# Patient Record
Sex: FEMALE | Employment: UNEMPLOYED
[De-identification: names, ages, dates, MRNs, and addresses within clinical notes are randomized per-mention and may not be internally consistent; named-entity substitution may affect disease eponyms.]

---

## 2023-10-24 ENCOUNTER — APPOINTMENT (OUTPATIENT)
Dept: INTERNAL MEDICINE | Facility: OTHER | Age: 53
End: 2023-10-24
Payer: MEDICAID

## 2023-11-06 ENCOUNTER — OFFICE VISIT (OUTPATIENT)
Dept: INTERNAL MEDICINE | Facility: OTHER | Age: 53
End: 2023-11-06
Payer: MEDICAID

## 2023-11-06 VITALS
OXYGEN SATURATION: 97 % | TEMPERATURE: 97.8 F | HEIGHT: 66 IN | WEIGHT: 198.6 LBS | HEART RATE: 85 BPM | SYSTOLIC BLOOD PRESSURE: 124 MMHG | BODY MASS INDEX: 31.92 KG/M2 | DIASTOLIC BLOOD PRESSURE: 62 MMHG

## 2023-11-06 DIAGNOSIS — Z13.228 SCREENING FOR ENDOCRINE, NUTRITIONAL, METABOLIC AND IMMUNITY DISORDER: ICD-10-CM

## 2023-11-06 DIAGNOSIS — Z13.21 SCREENING FOR ENDOCRINE, NUTRITIONAL, METABOLIC AND IMMUNITY DISORDER: ICD-10-CM

## 2023-11-06 DIAGNOSIS — Z11.59 NEED FOR HEPATITIS C SCREENING TEST: ICD-10-CM

## 2023-11-06 DIAGNOSIS — Z13.29 SCREENING FOR ENDOCRINE, NUTRITIONAL, METABOLIC AND IMMUNITY DISORDER: ICD-10-CM

## 2023-11-06 DIAGNOSIS — E66.9 OBESITY (BMI 30-39.9): ICD-10-CM

## 2023-11-06 DIAGNOSIS — Z13.0 SCREENING FOR ENDOCRINE, NUTRITIONAL, METABOLIC AND IMMUNITY DISORDER: ICD-10-CM

## 2023-11-06 DIAGNOSIS — L91.8 CUTANEOUS SKIN TAGS: ICD-10-CM

## 2023-11-06 DIAGNOSIS — L40.9 PSORIASIS: ICD-10-CM

## 2023-11-06 DIAGNOSIS — Z11.4 SCREENING FOR HIV (HUMAN IMMUNODEFICIENCY VIRUS): ICD-10-CM

## 2023-11-06 PROCEDURE — 3078F DIAST BP <80 MM HG: CPT | Performed by: INTERNAL MEDICINE

## 2023-11-06 PROCEDURE — 3074F SYST BP LT 130 MM HG: CPT | Performed by: INTERNAL MEDICINE

## 2023-11-06 PROCEDURE — 11200 RMVL SKIN TAGS UP TO&INC 15: CPT | Performed by: INTERNAL MEDICINE

## 2023-11-06 PROCEDURE — 99203 OFFICE O/P NEW LOW 30 MIN: CPT | Mod: 25 | Performed by: INTERNAL MEDICINE

## 2023-11-06 RX ORDER — CLOBETASOL PROPIONATE 0.5 MG/G
OINTMENT TOPICAL
Qty: 60 G | Refills: 1 | Status: SHIPPED | OUTPATIENT
Start: 2023-11-06

## 2023-11-06 RX ORDER — OMEGA-3 FATTY ACIDS/FISH OIL 300-1000MG
400 CAPSULE ORAL
COMMUNITY

## 2023-11-06 ASSESSMENT — PATIENT HEALTH QUESTIONNAIRE - PHQ9: CLINICAL INTERPRETATION OF PHQ2 SCORE: 0

## 2023-11-06 ASSESSMENT — FIBROSIS 4 INDEX: FIB4 SCORE: 1.05

## 2023-11-06 NOTE — PROGRESS NOTES
CC:  New patient     HISTORY OF THE PRESENT ILLNESS: Patient is a 53 y.o. female who presents to [facility]. Patient lives in [place]. She recently received medical insurance. Patient reports a PMH for psoriasis, diagnosed in her teens. Initally affected her bilateral anterior legs. She was on topical therapy with improve. However, in the last 10 years, she has recurrence of psoriatic plaques on her elbows and ears. She denies any pain, occasional bleeding from scratching. No drainage.    Patient reports a history of psoriasis, was diagnosed in her teens. She was on topical treatment with improvement, previously affecting the anterior legs. She reports psoriasis of the elbow and ears that started in her 40s. She has been using distilled vingear which helps some. Denies any pain, occasional bleeding when she scratches.     She has skin tags around her neck that are bothersome. Denies any pain or bleeding. She would like them removed.     Patient is also concerned about her varicose veins, mostly on her right leg. She reports that her mother and aunt had very bad varicose veins. She denies any pain. Sometimes has pulling sensation, but does not impede walking or her ADLs.    Health Maintenance:     Screening/Preventative Topics:  Cholesterol Screening: overdue  Diet: Well-balanced   Exercise: Walks 3 times weekly. States that she is physical active as she cares for her 86 yo father.  Screen for depression: PHQ-2: 0  Substance Use: Occasional alcohol use, no tobacco and illict drug  Postmenopausal- 2 years. No abnormal vaginal bleeding. Occasional hot flashes and mood irritable.    Cancer screening  Colorectal Cancer Screenin2023  Cervical Cancer Screening: Never, abstinence for 47 years. Recently .   Breast Cancer Screening:       Immunizations:   Declined all vaccinations    Allergies: Patient has no allergy information on record.    Current Outpatient Medications Ordered in Epic   Medication  "Sig Dispense Refill    Ibuprofen 200 MG Cap Take 400 mg by mouth.      clobetasol (TEMOVATE) 0.05 % Ointment Apply to affected area twice daily for 2 weeks. 60 g 1     No current Epic-ordered facility-administered medications on file.       History reviewed. No pertinent past medical history.    History reviewed. No pertinent surgical history.    Social History     Tobacco Use    Smoking status: Never    Smokeless tobacco: Never   Vaping Use    Vaping Use: Never used   Substance Use Topics    Alcohol use: Not Currently    Drug use: Never       Social History     Social History Narrative    Not on file       Family History   Problem Relation Age of Onset    Heart Disease Mother 65    Hypertension Mother     Diabetes Mother     Valvular heart disease Father 85       ROS:   Constitutional: Negative for fever, chills, weight loss and malaise/fatigue.   HENT: Negative for ear pain, nosebleeds, congestion, sore throat and neck pain.    Eyes: Negative for blurred vision.   Respiratory: Negative for cough, sputum production, shortness of breath and wheezing.    Cardiovascular: Negative for chest pain, palpitations, orthopnea and leg swelling.   Gastrointestinal: Negative for heartburn, nausea, vomiting and abdominal pain.   Genitourinary: Negative for dysuria, urgency and frequency.   Musculoskeletal: Negative for myalgias, back pain and joint pain.   Skin: Positive scaly plaques of elbows and ear  Neurological: Negative for dizziness, tingling, tremors, sensory change, focal weakness and headaches.   Endo/Heme/Allergies: Does not bruise/bleed easily.   Psychiatric/Behavioral: Negative for depression, suicidal ideas and memory loss.   All other systems reviewed and are negative except as in HPI.        Exam: /62 (BP Location: Left arm, Patient Position: Sitting, BP Cuff Size: Large adult)   Pulse 85   Temp 36.6 °C (97.8 °F) (Temporal)   Ht 1.676 m (5' 6\")   Wt 90.1 kg (198 lb 9.6 oz)   SpO2 97%  Body mass index " is 32.05 kg/m².    General: Normal appearing. No distress.  HEENT: Normocephalic. Eyes conjunctiva clear lids without ptosis, pupils equal and reactive to light accommodation. Ears are normal contour. Small area of dry skin on the right tragus. Scaly dry skin of the left tragus and external canal. Tympanic membranes are benign.   Neck: Supple. Thyroid is not enlarged.  Pulmonary: Clear to ausculation.  Normal effort. No rales, ronchi, or wheezing.  Cardiovascular: Regular rate and rhythm without murmur. Carotid and radial pulses are intact and equal bilaterally.  Abdomen: Soft, nontender, nondistended. Normal bowel sounds.   Neurologic: Grossly nonfocal  Lymph: No cervical, or supraclavicular lymph nodes are palpable  Skin: Warm and dry.  Dry raised plaque of the right elbow  Musculoskeletal: Normal gait. No extremity cyanosis, clubbing, or edema.  Psych: Normal mood and affect. Alert and oriented x3. Judgment and insight is normal.    Assessment/Plan    1. Psoriasis  Long-standing history.   Trial of clobetasol for elbow and external ear canal treatment.  - clobetasol (TEMOVATE) 0.05 % Ointment; Apply to affected area twice daily for 2 weeks.  Dispense: 60 g; Refill: 1    2. Screening for HIV (human immunodeficiency virus)  - HIV AG/AB COMBO ASSAY SCREENING; Future    3. Need for hepatitis C screening test  - HEP C VIRUS ANTIBODY; Future    4. Screening for endocrine, nutritional, metabolic and immunity disorder  - CBC WITH DIFFERENTIAL; Future  - Comp Metabolic Panel; Future  - TSH WITH REFLEX TO FT4; Future  - Lipid Profile; Future  - HEMOGLOBIN A1C; Future    5. Obesity (BMI 30-39.9)  - Referral to Nutrition Services    6. Cutaneous skin tags  Cryotherapy to 10 lesions.    HCM  Patient to schedule appt for cervical cancer screening  Mammogram completed 9/2023  Colonoscopy completed 6/2023, with repeat in 2030 per patient  Immunization- pt decline immunizations.     Follow-up in 3 months.

## 2023-11-11 ENCOUNTER — HOSPITAL ENCOUNTER (OUTPATIENT)
Dept: LAB | Facility: MEDICAL CENTER | Age: 53
End: 2023-11-11
Attending: INTERNAL MEDICINE
Payer: MEDICAID

## 2023-11-11 DIAGNOSIS — Z11.4 SCREENING FOR HIV (HUMAN IMMUNODEFICIENCY VIRUS): ICD-10-CM

## 2023-11-11 DIAGNOSIS — Z13.29 SCREENING FOR ENDOCRINE, NUTRITIONAL, METABOLIC AND IMMUNITY DISORDER: ICD-10-CM

## 2023-11-11 DIAGNOSIS — Z13.21 SCREENING FOR ENDOCRINE, NUTRITIONAL, METABOLIC AND IMMUNITY DISORDER: ICD-10-CM

## 2023-11-11 DIAGNOSIS — Z13.0 SCREENING FOR ENDOCRINE, NUTRITIONAL, METABOLIC AND IMMUNITY DISORDER: ICD-10-CM

## 2023-11-11 DIAGNOSIS — Z13.228 SCREENING FOR ENDOCRINE, NUTRITIONAL, METABOLIC AND IMMUNITY DISORDER: ICD-10-CM

## 2023-11-11 DIAGNOSIS — Z11.59 NEED FOR HEPATITIS C SCREENING TEST: ICD-10-CM

## 2023-11-11 LAB
ALBUMIN SERPL BCP-MCNC: 4.6 G/DL (ref 3.2–4.9)
ALBUMIN/GLOB SERPL: 1.6 G/DL
ALP SERPL-CCNC: 56 U/L (ref 30–99)
ALT SERPL-CCNC: 20 U/L (ref 2–50)
ANION GAP SERPL CALC-SCNC: 10 MMOL/L (ref 7–16)
AST SERPL-CCNC: 16 U/L (ref 12–45)
BASOPHILS # BLD AUTO: 0.8 % (ref 0–1.8)
BASOPHILS # BLD: 0.04 K/UL (ref 0–0.12)
BILIRUB SERPL-MCNC: 0.4 MG/DL (ref 0.1–1.5)
BUN SERPL-MCNC: 14 MG/DL (ref 8–22)
CALCIUM ALBUM COR SERPL-MCNC: 9.7 MG/DL (ref 8.5–10.5)
CALCIUM SERPL-MCNC: 10.2 MG/DL (ref 8.5–10.5)
CHLORIDE SERPL-SCNC: 101 MMOL/L (ref 96–112)
CHOLEST SERPL-MCNC: 188 MG/DL (ref 100–199)
CO2 SERPL-SCNC: 27 MMOL/L (ref 20–33)
CREAT SERPL-MCNC: 0.89 MG/DL (ref 0.5–1.4)
EOSINOPHIL # BLD AUTO: 0.06 K/UL (ref 0–0.51)
EOSINOPHIL NFR BLD: 1.2 % (ref 0–6.9)
ERYTHROCYTE [DISTWIDTH] IN BLOOD BY AUTOMATED COUNT: 39.8 FL (ref 35.9–50)
EST. AVERAGE GLUCOSE BLD GHB EST-MCNC: 105 MG/DL
FASTING STATUS PATIENT QL REPORTED: NORMAL
GFR SERPLBLD CREATININE-BSD FMLA CKD-EPI: 78 ML/MIN/1.73 M 2
GLOBULIN SER CALC-MCNC: 2.9 G/DL (ref 1.9–3.5)
GLUCOSE SERPL-MCNC: 102 MG/DL (ref 65–99)
HBA1C MFR BLD: 5.3 % (ref 4–5.6)
HCT VFR BLD AUTO: 46.1 % (ref 37–47)
HCV AB SER QL: NORMAL
HDLC SERPL-MCNC: 44 MG/DL
HGB BLD-MCNC: 15.5 G/DL (ref 12–16)
HIV 1+2 AB+HIV1 P24 AG SERPL QL IA: NORMAL
IMM GRANULOCYTES # BLD AUTO: 0.01 K/UL (ref 0–0.11)
IMM GRANULOCYTES NFR BLD AUTO: 0.2 % (ref 0–0.9)
LDLC SERPL CALC-MCNC: 127 MG/DL
LYMPHOCYTES # BLD AUTO: 1.58 K/UL (ref 1–4.8)
LYMPHOCYTES NFR BLD: 31.1 % (ref 22–41)
MCH RBC QN AUTO: 30.3 PG (ref 27–33)
MCHC RBC AUTO-ENTMCNC: 33.6 G/DL (ref 32.2–35.5)
MCV RBC AUTO: 90 FL (ref 81.4–97.8)
MONOCYTES # BLD AUTO: 0.3 K/UL (ref 0–0.85)
MONOCYTES NFR BLD AUTO: 5.9 % (ref 0–13.4)
NEUTROPHILS # BLD AUTO: 3.09 K/UL (ref 1.82–7.42)
NEUTROPHILS NFR BLD: 60.8 % (ref 44–72)
NRBC # BLD AUTO: 0 K/UL
NRBC BLD-RTO: 0 /100 WBC (ref 0–0.2)
PLATELET # BLD AUTO: 195 K/UL (ref 164–446)
PMV BLD AUTO: 11 FL (ref 9–12.9)
POTASSIUM SERPL-SCNC: 4.6 MMOL/L (ref 3.6–5.5)
PROT SERPL-MCNC: 7.5 G/DL (ref 6–8.2)
RBC # BLD AUTO: 5.12 M/UL (ref 4.2–5.4)
SODIUM SERPL-SCNC: 138 MMOL/L (ref 135–145)
TRIGL SERPL-MCNC: 83 MG/DL (ref 0–149)
TSH SERPL DL<=0.005 MIU/L-ACNC: 1.74 UIU/ML (ref 0.38–5.33)
WBC # BLD AUTO: 5.1 K/UL (ref 4.8–10.8)

## 2023-11-11 PROCEDURE — 83036 HEMOGLOBIN GLYCOSYLATED A1C: CPT

## 2023-11-11 PROCEDURE — 87389 HIV-1 AG W/HIV-1&-2 AB AG IA: CPT

## 2023-11-11 PROCEDURE — 36415 COLL VENOUS BLD VENIPUNCTURE: CPT

## 2023-11-11 PROCEDURE — 80061 LIPID PANEL: CPT

## 2023-11-11 PROCEDURE — 84443 ASSAY THYROID STIM HORMONE: CPT

## 2023-11-11 PROCEDURE — 86803 HEPATITIS C AB TEST: CPT

## 2023-11-11 PROCEDURE — 80053 COMPREHEN METABOLIC PANEL: CPT

## 2023-11-11 PROCEDURE — 85025 COMPLETE CBC W/AUTO DIFF WBC: CPT

## 2024-02-12 ENCOUNTER — OFFICE VISIT (OUTPATIENT)
Dept: INTERNAL MEDICINE | Facility: OTHER | Age: 54
End: 2024-02-12
Payer: MEDICAID

## 2024-02-12 VITALS
HEART RATE: 93 BPM | HEIGHT: 66 IN | BODY MASS INDEX: 32.47 KG/M2 | OXYGEN SATURATION: 98 % | WEIGHT: 202 LBS | TEMPERATURE: 97.8 F | DIASTOLIC BLOOD PRESSURE: 81 MMHG | SYSTOLIC BLOOD PRESSURE: 121 MMHG

## 2024-02-12 DIAGNOSIS — Z01.419 ENCOUNTER FOR GYNECOLOGICAL EXAMINATION: ICD-10-CM

## 2024-02-12 DIAGNOSIS — N94.10 DYSPAREUNIA IN FEMALE: ICD-10-CM

## 2024-02-12 DIAGNOSIS — R10.31 RIGHT LOWER QUADRANT PAIN: ICD-10-CM

## 2024-02-12 DIAGNOSIS — Z12.4 SCREENING FOR CERVICAL CANCER: ICD-10-CM

## 2024-02-12 DIAGNOSIS — Z11.51 SCREENING FOR HPV (HUMAN PAPILLOMAVIRUS): ICD-10-CM

## 2024-02-12 DIAGNOSIS — R09.82 PND (POST-NASAL DRIP): ICD-10-CM

## 2024-02-12 PROCEDURE — 99396 PREV VISIT EST AGE 40-64: CPT | Performed by: INTERNAL MEDICINE

## 2024-02-12 PROCEDURE — 3079F DIAST BP 80-89 MM HG: CPT | Performed by: INTERNAL MEDICINE

## 2024-02-12 PROCEDURE — 3074F SYST BP LT 130 MM HG: CPT | Performed by: INTERNAL MEDICINE

## 2024-02-12 RX ORDER — LORATADINE 10 MG/1
10 TABLET ORAL DAILY
Qty: 30 TABLET | Refills: 2 | Status: SHIPPED | OUTPATIENT
Start: 2024-02-12

## 2024-02-12 ASSESSMENT — PATIENT HEALTH QUESTIONNAIRE - PHQ9: CLINICAL INTERPRETATION OF PHQ2 SCORE: 0

## 2024-02-12 ASSESSMENT — FIBROSIS 4 INDEX: FIB4 SCORE: 0.94

## 2024-02-12 ASSESSMENT — PAIN SCALES - GENERAL: PAINLEVEL: 5=MODERATE PAIN

## 2024-02-12 NOTE — PROGRESS NOTES
Subjective:     CC:   Chief Complaint   Patient presents with    Gynecologic Exam       HPI:   Lay Adame is a 53 y.o. female who presents for annual exam.     Patient has GYN provider: No   Last Pap Smear: Never  H/O Abnormal Pap: No  Last Mammogram: 2023  Last Colorectal Cancer Screenin2023  Last Tdap: declined  Received HPV series: No    Menopausal 2 years  She has not utilized hormone replacement therapy.  Reports of dyspareunia, most notable on insertion. She abstain for sexual activity until  at 51 yo. She denies any abnormal vaginal discharge or bleeding.  No significant bloating/fluid retention, pelvic pain. No abnormal vaginal discharge.   No breast tenderness, mass, nipple discharge or changes in size or contour.  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 She only wears pads now.    Exercise: minimal exercise, one hour walking weekly  Diet: Well-balance    Today, she also c/o right lower quadrant pain for the past 5 years, which has slightly worsen. Pain is sharp in nature, without radiation. Most notable with heavy lifting and straining. No history of abdominal surgeries. Denies any fever, nausea, vomiting, diarrhea, melena or bloody stool.      OB History   No obstetric history on file.      She  has no past medical history on file.  She  has no past surgical history on file.    Family History   Problem Relation Age of Onset    Heart Disease Mother 65    Hypertension Mother     Diabetes Mother     Valvular heart disease Father 85     Social History     Tobacco Use    Smoking status: Never    Smokeless tobacco: Never   Vaping Use    Vaping Use: Never used   Substance Use Topics    Alcohol use: Not Currently    Drug use: Never       Patient Active Problem List    Diagnosis Date Noted    Obesity (BMI 30-39.9) 2023     Current Outpatient Medications   Medication Sig Dispense Refill    loratadine (CLARITIN) 10 MG Tab Take 1 Tablet by mouth every day. 30 Tablet  "2    Ibuprofen 200 MG Cap Take 400 mg by mouth.      clobetasol (TEMOVATE) 0.05 % Ointment Apply to affected area twice daily for 2 weeks. 60 g 1     No current facility-administered medications for this visit.         Review of Systems   Constitutional: Negative for fever, chills and malaise/fatigue.   HENT: Negative for congestion.    Eyes: Negative for pain.   Respiratory: Negative for cough and shortness of breath.    Cardiovascular: Negative for chest pain and leg swelling.   Gastrointestinal: Negative for nausea, vomiting, abdominal pain and diarrhea.   Genitourinary: Negative for dysuria and hematuria.   Skin: Negative for rash.   Neurological: Negative for dizziness, focal weakness and headaches.   Endo/Heme/Allergies: Does not bruise/bleed easily.   Psychiatric/Behavioral: Negative for depression.  The patient is not nervous/anxious.      Objective:   /81 (BP Location: Left arm, Patient Position: Sitting, BP Cuff Size: Adult)   Pulse 93   Temp 36.6 °C (97.8 °F) (Temporal)   Ht 1.676 m (5' 6\")   Wt 91.6 kg (202 lb)   SpO2 98%   BMI 32.60 kg/m²     Wt Readings from Last 4 Encounters:   02/12/24 91.6 kg (202 lb)   11/06/23 90.1 kg (198 lb 9.6 oz)     Physical Exam:  Constitutional: Well-developed and well-nourished. Not diaphoretic. No distress.   Skin: Skin is warm and dry. No rash noted.  Head: Atraumatic without lesions.  Eyes: Conjunctivae and extraocular motions are normal. Pupils are equal, round, and reactive to light. No scleral icterus.   Ears:  External ears unremarkable. Tympanic membranes clear and intact.  Nose: Nares patent. Septum midline. Turbinates edematous with dried blood. No discharge.   Mouth/Throat: Tongue normal. Oropharynx is clear and moist. Posterior pharynx without erythema or exudates.  Neck: Supple, trachea midline. Normal range of motion. No thyromegaly present. No lymphadenopathy--cervical or supraclavicular.  Cardiovascular: Regular rate and rhythm, without murmur, " rubs, or gallops.    Respiratory: Effort normal. Clear to auscultation throughout. No adventitious sounds.   Abdomen: Soft, non tender, and without distention. Active bowel sounds in all four quadrants. No rebound, guarding, masses or HSM.  : Perineum and external genitalia normal without rash. Vagina with thin and malodorous discharge. Cervix without visible lesions or discharge. Bimanual exam without adnexal masses or cervical motion tenderness.  Extremities: No cyanosis, clubbing, erythema, nor edema. Distal pulses intact and symmetric.   Musculoskeletal: All major joints AROM full in all directions without pain.  Neurological: Alert and oriented x 3. Grossly non-focal. Strength and sensation grossly intact. DTRs 2+/3 and symmetric.   Psychiatric:  Behavior, mood, and affect are appropriate.    Assessment and Plan:     Encounter for gynecological examination  Screening for cervical cancer  Screening for HPV (human papillomavirus)  - Thinprep Pap with HPV; Future    Dyspareunia in female  Notable on insertion. She abstain for sexual activity until marriage at the age of 49 yo.  Exam was unremarkable, maybe to vaginal dryness/postmenopause.  - Chlamydia/GC, PCR (Genital/Anal swab); Future  - VAGINAL PATHOGENS DNA PANEL; Future    PND (post-nasal drip)  Recommend daily nasal rinse and oral antihistamine.  Patient with dry blood in the nasal passage, recommend humidifier nightly, may use Vaseline in the nares, avoid inserting tissues in the nose. Will hold off on fluticasone due to bleeding, once healed, consider addition of nasal steroid.   - loratadine (CLARITIN) 10 MG Tab; Take 1 Tablet by mouth every day.  Dispense: 30 Tablet; Refill: 2    Right lower quadrant pain  No alarming signs. Possible hernia vs iliopsoas pain.  Colonoscopy done 06/2023 with one sigmoid polyp, otherwise normal colon and terminal ileum   Will obtain US to evaluate for hernia.   - US-HERNIA ABDOMEN; Future      Health maintenance:    Labs  per orders  Immunizations per orders  Patient counseled about skin care, diet, supplements, recreational drug/alcohol and exercise.  Discussed  mammography screening, feminine hygiene, menopause, osteoporosis, adequate intake of calcium and vitamin D, diet and exercise, colorectal cancer screening     Follow-up: Return in about 3 months (around 5/12/2024).

## 2024-02-12 NOTE — PATIENT INSTRUCTIONS
Recommend nasal saline rinses nightly. Start using a humidifier nightly.  Start anti-histamine daily.

## 2024-02-16 DIAGNOSIS — Z01.419 ENCOUNTER FOR GYNECOLOGICAL EXAMINATION: ICD-10-CM

## 2024-02-16 DIAGNOSIS — N94.10 DYSPAREUNIA IN FEMALE: ICD-10-CM

## 2024-02-20 DIAGNOSIS — Z11.51 SCREENING FOR HPV (HUMAN PAPILLOMAVIRUS): ICD-10-CM

## 2024-02-20 DIAGNOSIS — Z12.4 SCREENING FOR CERVICAL CANCER: ICD-10-CM

## 2024-02-20 DIAGNOSIS — Z01.419 ENCOUNTER FOR GYNECOLOGICAL EXAMINATION: ICD-10-CM

## 2024-02-29 ENCOUNTER — APPOINTMENT (OUTPATIENT)
Dept: RADIOLOGY | Facility: MEDICAL CENTER | Age: 54
End: 2024-02-29
Attending: INTERNAL MEDICINE
Payer: MEDICAID

## 2024-03-11 ENCOUNTER — HOSPITAL ENCOUNTER (OUTPATIENT)
Dept: RADIOLOGY | Facility: MEDICAL CENTER | Age: 54
End: 2024-03-11
Attending: INTERNAL MEDICINE
Payer: MEDICAID

## 2024-03-11 DIAGNOSIS — R10.31 RIGHT LOWER QUADRANT PAIN: ICD-10-CM

## 2024-03-11 PROCEDURE — 76705 ECHO EXAM OF ABDOMEN: CPT

## 2024-03-13 DIAGNOSIS — N94.10 DYSPAREUNIA IN FEMALE: ICD-10-CM

## 2024-03-13 RX ORDER — ESTRADIOL 0.1 MG/G
CREAM VAGINAL
Qty: 42.5 G | Refills: 2 | Status: SHIPPED | OUTPATIENT
Start: 2024-03-13

## 2024-06-03 ENCOUNTER — APPOINTMENT (OUTPATIENT)
Dept: INTERNAL MEDICINE | Facility: OTHER | Age: 54
End: 2024-06-03
Payer: MEDICAID

## 2024-06-03 VITALS
SYSTOLIC BLOOD PRESSURE: 128 MMHG | HEART RATE: 85 BPM | WEIGHT: 198.4 LBS | HEIGHT: 66 IN | DIASTOLIC BLOOD PRESSURE: 81 MMHG | OXYGEN SATURATION: 98 % | BODY MASS INDEX: 31.88 KG/M2 | TEMPERATURE: 97.8 F

## 2024-06-03 DIAGNOSIS — L40.9 PSORIASIS: ICD-10-CM

## 2024-06-03 DIAGNOSIS — N95.2 ATROPHIC VAGINITIS: ICD-10-CM

## 2024-06-03 DIAGNOSIS — R09.82 PND (POST-NASAL DRIP): ICD-10-CM

## 2024-06-03 DIAGNOSIS — Z12.31 ENCOUNTER FOR SCREENING MAMMOGRAM FOR MALIGNANT NEOPLASM OF BREAST: ICD-10-CM

## 2024-06-03 PROCEDURE — 3079F DIAST BP 80-89 MM HG: CPT | Performed by: INTERNAL MEDICINE

## 2024-06-03 PROCEDURE — 3074F SYST BP LT 130 MM HG: CPT | Performed by: INTERNAL MEDICINE

## 2024-06-03 PROCEDURE — 99214 OFFICE O/P EST MOD 30 MIN: CPT | Performed by: INTERNAL MEDICINE

## 2024-06-03 RX ORDER — FLUTICASONE PROPIONATE 50 MCG
1 SPRAY, SUSPENSION (ML) NASAL DAILY
Qty: 16 G | Refills: 1 | Status: SHIPPED | OUTPATIENT
Start: 2024-06-03

## 2024-06-03 RX ORDER — LORATADINE 10 MG/1
10 TABLET ORAL DAILY
Qty: 30 TABLET | Refills: 2 | Status: SHIPPED | OUTPATIENT
Start: 2024-06-03

## 2024-06-03 RX ORDER — ESTRADIOL 0.1 MG/G
CREAM VAGINAL
Qty: 42.5 G | Refills: 2 | Status: SHIPPED | OUTPATIENT
Start: 2024-06-03

## 2024-06-03 RX ORDER — CLOBETASOL PROPIONATE 0.5 MG/G
OINTMENT TOPICAL
Qty: 60 G | Refills: 1 | Status: SHIPPED | OUTPATIENT
Start: 2024-06-03

## 2024-06-03 ASSESSMENT — FIBROSIS 4 INDEX: FIB4 SCORE: 0.94

## 2024-06-03 NOTE — PROGRESS NOTES
"6/3/2024  Chief Complaint   Patient presents with    Follow-Up     \"Hacking cough\"     Medication Refill       HISTORY OF PRESENT ILLNESS: Patient is a 53 y.o. female established patient who presents today for follow-up. She reports that over she is doing well. Although, she reports a hacking cough for the past 3 weeks. About 4 weeks ago, she felt ill- had chills, sore throat and fatigue. Her symptoms improved about 24 hours but cough remained. She has associate nasal congestion, but denies any fevers, chills, shortness of breath, chest pain, facial pressure or pain. Cough is worse at night when she lies down, she is taking Claritin and using a nasal saline spray which helps some.    Patient also reports that since starting the vaginal estrogen, dysparurnia has sigificantly improved. She is using cream 2 times weekly. Denies abnormal vaginal discharge, bleeding or pain.   She reports that her dysparenia has markedly improved with vaginal estrogen, she is using cream 2 times weekly.  Overall tolerating it well.    She reports about 4 weeks ago, she did feel ill- she states for 24 hours chills, sore throat and fatigue. She denies any fevers, shortness of breath or chest pain. She feels better but reports that she has this hacking cough for about 3 weeks, nasal congestion and is worse at night.   She is using a nasal rinse and Claritin.       Patient Active Problem List    Diagnosis Date Noted    Obesity (BMI 30-39.9) 11/06/2023       Allergies:Patient has no known allergies.    Current Outpatient Medications   Medication Sig Dispense Refill    fluticasone (FLONASE) 50 MCG/ACT nasal spray Administer 1 Spray into affected nostril(S) every day. 16 g 1    clobetasol (TEMOVATE) 0.05 % Ointment Apply to affected area twice daily for 2 weeks. 60 g 1    estradiol (ESTRACE) 0.1 MG/GM vaginal cream Insert 1g intravaginally one to three times per week. 42.5 g 2    loratadine (CLARITIN) 10 MG Tab Take 1 Tablet by mouth every day. " "30 Tablet 2    Ibuprofen 200 MG Cap Take 400 mg by mouth.       No current facility-administered medications for this visit.       Social History     Tobacco Use    Smoking status: Never    Smokeless tobacco: Never   Vaping Use    Vaping status: Never Used   Substance Use Topics    Alcohol use: Not Currently    Drug use: Never       Family History   Problem Relation Age of Onset    Heart Disease Mother 65    Hypertension Mother     Diabetes Mother     Valvular heart disease Father 85         Review of Systems:   Review of Systems   Constitutional:  Negative for chills, fever, malaise/fatigue and weight loss.   HENT:  Positive for congestion. Negative for ear pain, nosebleeds and sore throat.    Eyes:  Negative for blurred vision and double vision.   Respiratory:  Positive for cough. Negative for hemoptysis, sputum production, shortness of breath and wheezing.    Cardiovascular:  Negative for chest pain, palpitations and leg swelling.   Gastrointestinal:  Negative for abdominal pain, blood in stool, constipation, diarrhea, nausea and vomiting.   Genitourinary:  Negative for dysuria, frequency and urgency.   Musculoskeletal:  Negative for joint pain.   Skin:  Negative for itching and rash.   Neurological:  Negative for dizziness, weakness and headaches.   Psychiatric/Behavioral:  Negative for depression. The patient is not nervous/anxious.        Exam:  /81 (BP Location: Left arm, Patient Position: Sitting, BP Cuff Size: Adult)   Pulse 85   Temp 36.6 °C (97.8 °F) (Temporal)   Ht 1.676 m (5' 6\")   Wt 90 kg (198 lb 6.4 oz)   SpO2 98%  Body mass index is 32.02 kg/m².  Physical Exam  Constitutional:       General: She is not in acute distress.     Appearance: She is obese.   HENT:      Head: Normocephalic and atraumatic.      Right Ear: Tympanic membrane and ear canal normal.      Left Ear: Tympanic membrane and ear canal normal.      Nose: Congestion and rhinorrhea present.      Mouth/Throat:      Mouth: " Mucous membranes are moist.      Pharynx: Oropharynx is clear. No oropharyngeal exudate or posterior oropharyngeal erythema.   Eyes:      Extraocular Movements: Extraocular movements intact.      Conjunctiva/sclera: Conjunctivae normal.   Cardiovascular:      Rate and Rhythm: Normal rate and regular rhythm.      Pulses: Normal pulses.   Pulmonary:      Effort: Pulmonary effort is normal.      Breath sounds: Normal breath sounds.   Abdominal:      General: Abdomen is flat.      Palpations: Abdomen is soft.   Musculoskeletal:      Cervical back: Neck supple.      Right lower leg: No edema.      Left lower leg: No edema.   Lymphadenopathy:      Cervical: No cervical adenopathy.   Skin:     General: Skin is warm and dry.   Neurological:      General: No focal deficit present.      Mental Status: She is alert.   Psychiatric:         Mood and Affect: Mood normal.         Behavior: Behavior normal.         Thought Content: Thought content normal.         Judgment: Judgment normal.         Assessment/Plan:     1. Atrophic vaginitis  Dyspareunia has improved with cream.  Continue Estrace 2 times weeekly  - estradiol (ESTRACE) 0.1 MG/GM vaginal cream; Insert 1g intravaginally one to three times per week.  Dispense: 42.5 g; Refill: 2    2. PND (post-nasal drip)  Continue Claritin  Recommend nasal saline rinse nightly, followed by Flonase.  - fluticasone (FLONASE) 50 MCG/ACT nasal spray; Administer 1 Spray into affected nostril(S) every day.  Dispense: 16 g; Refill: 1  - loratadine (CLARITIN) 10 MG Tab; Take 1 Tablet by mouth every day.  Dispense: 30 Tablet; Refill: 2    3. Psoriasis  - clobetasol (TEMOVATE) 0.05 % Ointment; Apply to affected area twice daily for 2 weeks.  Dispense: 60 g; Refill: 1    4. Encounter for screening mammogram for malignant neoplasm of breast  - MA-SCREENING MAMMO BILAT W/TOMOSYNTHESIS W/CAD; Future    Patient declines vaccination today, reports she believes she has her Tdap within 10 years  (completed in [place])    All imaging results and lab results and consult notes are reviewed at this visit.  Followup: Return in about 6 months (around 12/3/2024).

## 2024-06-04 ASSESSMENT — ENCOUNTER SYMPTOMS
DEPRESSION: 0
ABDOMINAL PAIN: 0
SPUTUM PRODUCTION: 0
CONSTIPATION: 0
HEMOPTYSIS: 0
SORE THROAT: 0
VOMITING: 0
BLOOD IN STOOL: 0
WEAKNESS: 0
PALPITATIONS: 0
NAUSEA: 0
WHEEZING: 0
DIZZINESS: 0
SHORTNESS OF BREATH: 0
WEIGHT LOSS: 0
COUGH: 1
DOUBLE VISION: 0
CHILLS: 0
BLURRED VISION: 0
NERVOUS/ANXIOUS: 0
DIARRHEA: 0
FEVER: 0
HEADACHES: 0

## 2024-06-13 ENCOUNTER — HOSPITAL ENCOUNTER (OUTPATIENT)
Dept: RADIOLOGY | Facility: MEDICAL CENTER | Age: 54
End: 2024-06-13
Payer: MEDICAID

## 2024-09-16 ENCOUNTER — APPOINTMENT (OUTPATIENT)
Dept: RADIOLOGY | Facility: MEDICAL CENTER | Age: 54
End: 2024-09-16
Attending: INTERNAL MEDICINE
Payer: MEDICAID

## 2024-09-24 ENCOUNTER — HOSPITAL ENCOUNTER (OUTPATIENT)
Dept: RADIOLOGY | Facility: MEDICAL CENTER | Age: 54
End: 2024-09-24
Attending: INTERNAL MEDICINE
Payer: MEDICAID

## 2024-09-24 DIAGNOSIS — Z12.31 ENCOUNTER FOR SCREENING MAMMOGRAM FOR MALIGNANT NEOPLASM OF BREAST: ICD-10-CM

## 2024-09-24 PROCEDURE — 77067 SCR MAMMO BI INCL CAD: CPT

## 2024-12-03 ENCOUNTER — OFFICE VISIT (OUTPATIENT)
Dept: INTERNAL MEDICINE | Facility: OTHER | Age: 54
End: 2024-12-03
Payer: MEDICAID

## 2024-12-03 VITALS
TEMPERATURE: 96.7 F | OXYGEN SATURATION: 97 % | BODY MASS INDEX: 32.05 KG/M2 | DIASTOLIC BLOOD PRESSURE: 82 MMHG | WEIGHT: 199.4 LBS | HEIGHT: 66 IN | SYSTOLIC BLOOD PRESSURE: 127 MMHG | HEART RATE: 78 BPM

## 2024-12-03 DIAGNOSIS — E66.9 OBESITY (BMI 30-39.9): ICD-10-CM

## 2024-12-03 DIAGNOSIS — R09.82 PND (POST-NASAL DRIP): ICD-10-CM

## 2024-12-03 DIAGNOSIS — R07.89 ATYPICAL CHEST PAIN: ICD-10-CM

## 2024-12-03 DIAGNOSIS — Z91.89 AT RISK FOR SLEEP APNEA: ICD-10-CM

## 2024-12-03 DIAGNOSIS — M54.10 RADICULOPATHY AFFECTING UPPER EXTREMITY: ICD-10-CM

## 2024-12-03 DIAGNOSIS — G89.29 CHRONIC LEFT SHOULDER PAIN: ICD-10-CM

## 2024-12-03 DIAGNOSIS — E78.5 DYSLIPIDEMIA: ICD-10-CM

## 2024-12-03 DIAGNOSIS — M25.512 CHRONIC LEFT SHOULDER PAIN: ICD-10-CM

## 2024-12-03 PROCEDURE — 3079F DIAST BP 80-89 MM HG: CPT | Performed by: INTERNAL MEDICINE

## 2024-12-03 PROCEDURE — 3074F SYST BP LT 130 MM HG: CPT | Performed by: INTERNAL MEDICINE

## 2024-12-03 PROCEDURE — 93000 ELECTROCARDIOGRAM COMPLETE: CPT | Performed by: INTERNAL MEDICINE

## 2024-12-03 PROCEDURE — 99214 OFFICE O/P EST MOD 30 MIN: CPT | Performed by: INTERNAL MEDICINE

## 2024-12-03 ASSESSMENT — FIBROSIS 4 INDEX: FIB4 SCORE: 0.95

## 2024-12-03 NOTE — PROGRESS NOTES
12/3/2024  Chief Complaint   Patient presents with    Follow-Up     Symptoms of sleep apnea and family hx of heart issues       HISTORY OF PRESENT ILLNESS: Patient is a 54 y.o. female established patient who presents today for follow-up.     Chest pain  Today, she reports intermittent left chest pain that comes and goes x 1 year. Believed that her left anterior chest pain was associated with her chronic left shoulder pain, which she injured in her 20s. Pain is generally 4/10, described as sore and sharp at times. Denies any shortness of breath, N/V. Has associated numbness/tingling in the left arm that has been present for about 6 years, comes and goes, can be present when she wakes up due to her left shoulder injury. She does report that shoulder pain has progressively worsen over the years, which she attributes to aging. 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 Generally, her pain involves the left trapezius muscle and wrap over her shoulder to her chest. Pain can be reproducible on palpation. Resting the left arm relieves the pain.  She does not exercise routinely, but remains active. She can climb a flight of stairs, vacuum/mop the house without chest pain or shortness of breath. She was swimming regularly without issues, however, [place] so she did swim as often. Given her family history, she is concerned about her heart-- mother with CAD in her 60s (had hypertension, DMT2, HLD) and Uncle with CAD in his 50s.    Snoring  Patient also endorse concerns for sleep apnea. Her sister recorded her snoring loudly during Thanksgiving and mentioned that she may have sleep apnea. Patient does report daytime fatigue. She wakes up 3-4 times a night, to drink water as she feels dehydrated. She denies mood irritability, morning naps or frequent naps.     Patient Active Problem List    Diagnosis Date Noted    Dyslipidemia 12/03/2024    Obesity (BMI 30-39.9) 11/06/2023        Allergies:Patient has no known allergies.    Current Outpatient Medications   Medication Sig Dispense Refill    fluticasone (FLONASE) 50 MCG/ACT nasal spray Administer 1 Spray into affected nostril(S) every day. 16 g 1    clobetasol (TEMOVATE) 0.05 % Ointment Apply to affected area twice daily for 2 weeks. 60 g 1    estradiol (ESTRACE) 0.1 MG/GM vaginal cream Insert 1g intravaginally one to three times per week. 42.5 g 2    loratadine (CLARITIN) 10 MG Tab Take 1 Tablet by mouth every day. 30 Tablet 2    Ibuprofen 200 MG Cap Take 400 mg by mouth.       No current facility-administered medications for this visit.       Social History     Tobacco Use    Smoking status: Never    Smokeless tobacco: Never   Vaping Use    Vaping status: Never Used   Substance Use Topics    Alcohol use: Not Currently    Drug use: Never       Family History   Problem Relation Age of Onset    Heart Disease Mother 65    Hypertension Mother     Diabetes Mother     Valvular heart disease Father 85         Review of Systems:   Review of Systems   Constitutional:  Positive for malaise/fatigue. Negative for chills, diaphoresis, fever and weight loss.   HENT:  Positive for congestion. Negative for sore throat.    Eyes:  Negative for blurred vision and double vision.   Respiratory:  Negative for cough, sputum production, shortness of breath and wheezing.    Cardiovascular:  Negative for chest pain, palpitations and leg swelling.   Gastrointestinal:  Negative for abdominal pain, diarrhea, nausea and vomiting.   Genitourinary:  Negative for dysuria, frequency and urgency.   Musculoskeletal:  Positive for joint pain and neck pain.   Skin:  Negative for itching and rash.   Neurological:  Negative for dizziness, weakness and headaches.   Psychiatric/Behavioral:  Negative for depression. The patient is not nervous/anxious.        Exam:  /82 (BP Location: Left arm, Patient Position: Sitting, BP Cuff Size: Adult)   Pulse 78   Temp 35.9 °C (96.7  "°F) (Temporal)   Ht 1.676 m (5' 6\")   Wt 90.4 kg (199 lb 6.4 oz)   SpO2 97%  Body mass index is 32.18 kg/m².  Physical Exam  Constitutional:       General: She is not in acute distress.     Appearance: She is not toxic-appearing.   HENT:      Head: Normocephalic and atraumatic.      Right Ear: Tympanic membrane, ear canal and external ear normal.      Left Ear: Tympanic membrane, ear canal and external ear normal.      Mouth/Throat:      Pharynx: Oropharynx is clear.   Eyes:      Extraocular Movements: Extraocular movements intact.      Conjunctiva/sclera: Conjunctivae normal.   Cardiovascular:      Rate and Rhythm: Normal rate and regular rhythm.      Pulses: Normal pulses.      Comments: Mild TTP of left anterior chest wall  Pulmonary:      Effort: Pulmonary effort is normal. No respiratory distress.      Breath sounds: Normal breath sounds. No stridor. No wheezing or rales.   Abdominal:      General: Bowel sounds are normal. There is no distension.      Palpations: Abdomen is soft. There is no mass.      Tenderness: There is no abdominal tenderness.   Musculoskeletal:      Cervical back: Neck supple.      Right lower leg: No edema.      Left lower leg: No edema.      Comments: Hypertonic left cervical and thoracic paraspinal musculature. No bony or midline tenderness. Full ROM of bilateral upper extremity, strength 5/5   Skin:     General: Skin is warm and dry.   Neurological:      Mental Status: She is alert.         Assessment/Plan:     Atypical chest pain  In clinic EKG without pathologic Q waves or acute findings.  Suspect that left anterior chest pain is related to MSK pathology given that it is reproducible and associated with left shoulder pain.  Is able to climb a flight of stairs, mop and vacuum her house without any reoccurrence of chest pain or shortness of breath.  However, given her strong family history of CAD we will obtain a NM stress test  LDL previously was 127 her ASCVD score is 1.8%  Annual " lipid profile  - EKG - Clinic Performed  - NM-CARDIAC STRESS TEST; Future    Chronic left shoulder pain  Radiculopathy affecting upper extremity  Reports left shoulder injury in her 20s while working at [employer]. She is also left-hand dominant.  Pain and radiculopathy has worsened over the course of several years, no recent imaging  Will obtain Xray of neck and left shoulder,  We discussed a referral to PT, however, patient deferred. Shoulder exercises handout provided to pain.   Recommend Tylenol/Ibuprofen as needed  - DX-SHOULDER 2+ LEFT; Future  - DX-CERVICAL SPINE-2 OR 3 VIEWS; Future    Dyslipidemia  ASCVD score 1.8%, no statin therapy indicated at this time  May consider CT calcium score given FMHx CAD  - Lipid Profile; Future    Obesity (BMI 30-39.9)  Patient meets criteria for having weight management issue based on their BMI; appropriate education counseling were provided please see below for some details:  - Encouraged diet high in fruits, vegetables, and fiber. And a diet low in salt and processed foods because of their cholesterol, saturated fat, and trans fatty acids content.    - Saturated fats are also found in creams, cheeses, butter, mayonnaise, and fried foods.  - Encouraged a minimum of 15 minutes of moderate intensity aerobic exercise (eg, brisk walking) is recommended on five days each week. Or 20 minutes of vigorous-intensity aerobic exercise (eg, jogging) on three days each week.   - Referral to Nutrition Services  - HEMOGLOBIN A1C; Future    At risk for sleep apnea  - Referral to Pulmonary and Sleep Medicine    PND (post-nasal drip)  Continue nasal saline rinses, Flonase and Claritin    All imaging results and lab results and consult notes are reviewed at this visit.  Followup: Return in about 8 weeks (around 1/28/2025).

## 2024-12-04 ASSESSMENT — ENCOUNTER SYMPTOMS
VOMITING: 0
NERVOUS/ANXIOUS: 0
FEVER: 0
NAUSEA: 0
PALPITATIONS: 0
WEIGHT LOSS: 0
ABDOMINAL PAIN: 0
SORE THROAT: 0
CHILLS: 0
DOUBLE VISION: 0
WHEEZING: 0
DEPRESSION: 0
WEAKNESS: 0
BLURRED VISION: 0
DIZZINESS: 0
SHORTNESS OF BREATH: 0
COUGH: 0
SPUTUM PRODUCTION: 0
DIARRHEA: 0
HEADACHES: 0
DIAPHORESIS: 0
NECK PAIN: 1

## 2024-12-09 ENCOUNTER — HOSPITAL ENCOUNTER (OUTPATIENT)
Dept: RADIOLOGY | Facility: MEDICAL CENTER | Age: 54
End: 2024-12-09
Attending: INTERNAL MEDICINE
Payer: MEDICAID

## 2024-12-09 DIAGNOSIS — M25.512 CHRONIC LEFT SHOULDER PAIN: ICD-10-CM

## 2024-12-09 DIAGNOSIS — M54.10 RADICULOPATHY AFFECTING UPPER EXTREMITY: ICD-10-CM

## 2024-12-09 DIAGNOSIS — R07.89 ATYPICAL CHEST PAIN: ICD-10-CM

## 2024-12-09 DIAGNOSIS — G89.29 CHRONIC LEFT SHOULDER PAIN: ICD-10-CM

## 2024-12-09 PROCEDURE — 72040 X-RAY EXAM NECK SPINE 2-3 VW: CPT

## 2024-12-09 PROCEDURE — 73030 X-RAY EXAM OF SHOULDER: CPT | Mod: LT

## 2024-12-09 PROCEDURE — A9502 TC99M TETROFOSMIN: HCPCS

## 2024-12-10 ENCOUNTER — TELEPHONE (OUTPATIENT)
Dept: INTERNAL MEDICINE | Facility: OTHER | Age: 54
End: 2024-12-10
Payer: MEDICAID

## 2024-12-10 DIAGNOSIS — M62.838 MUSCLE SPASM: ICD-10-CM

## 2024-12-10 DIAGNOSIS — M25.512 CHRONIC LEFT SHOULDER PAIN: ICD-10-CM

## 2024-12-10 DIAGNOSIS — G89.29 CHRONIC LEFT SHOULDER PAIN: ICD-10-CM

## 2024-12-10 RX ORDER — CYCLOBENZAPRINE HCL 5 MG
5 TABLET ORAL 2 TIMES DAILY PRN
Qty: 20 TABLET | Refills: 0 | Status: SHIPPED | OUTPATIENT
Start: 2024-12-10 | End: 2024-12-20

## 2025-01-08 ENCOUNTER — HOSPITAL ENCOUNTER (OUTPATIENT)
Dept: LAB | Facility: MEDICAL CENTER | Age: 55
End: 2025-01-08
Attending: INTERNAL MEDICINE
Payer: MEDICAID

## 2025-01-08 DIAGNOSIS — E78.5 DYSLIPIDEMIA: ICD-10-CM

## 2025-01-08 DIAGNOSIS — E66.9 OBESITY (BMI 30-39.9): ICD-10-CM

## 2025-01-08 LAB
CHOLEST SERPL-MCNC: 202 MG/DL (ref 100–199)
EST. AVERAGE GLUCOSE BLD GHB EST-MCNC: 103 MG/DL
HBA1C MFR BLD: 5.2 % (ref 4–5.6)
HDLC SERPL-MCNC: 50 MG/DL
LDLC SERPL CALC-MCNC: 132 MG/DL
TRIGL SERPL-MCNC: 100 MG/DL (ref 0–149)

## 2025-01-08 PROCEDURE — 80061 LIPID PANEL: CPT

## 2025-01-08 PROCEDURE — 36415 COLL VENOUS BLD VENIPUNCTURE: CPT

## 2025-01-08 PROCEDURE — 83036 HEMOGLOBIN GLYCOSYLATED A1C: CPT

## 2025-01-27 ENCOUNTER — APPOINTMENT (OUTPATIENT)
Dept: INTERNAL MEDICINE | Facility: OTHER | Age: 55
End: 2025-01-27
Payer: MEDICAID

## 2025-01-27 VITALS
DIASTOLIC BLOOD PRESSURE: 70 MMHG | BODY MASS INDEX: 32.14 KG/M2 | HEART RATE: 70 BPM | WEIGHT: 200 LBS | OXYGEN SATURATION: 97 % | SYSTOLIC BLOOD PRESSURE: 125 MMHG | TEMPERATURE: 98.6 F | HEIGHT: 66 IN

## 2025-01-27 DIAGNOSIS — Z91.89 AT RISK FOR SLEEP APNEA: ICD-10-CM

## 2025-01-27 DIAGNOSIS — R04.0 BLEEDING FROM THE NOSE: ICD-10-CM

## 2025-01-27 DIAGNOSIS — M54.10 RADICULOPATHY AFFECTING UPPER EXTREMITY: ICD-10-CM

## 2025-01-27 DIAGNOSIS — G62.9 NEUROPATHY: ICD-10-CM

## 2025-01-27 DIAGNOSIS — G89.29 CHRONIC LEFT SHOULDER PAIN: ICD-10-CM

## 2025-01-27 DIAGNOSIS — E78.5 DYSLIPIDEMIA: ICD-10-CM

## 2025-01-27 DIAGNOSIS — E66.9 OBESITY (BMI 30-39.9): ICD-10-CM

## 2025-01-27 DIAGNOSIS — R07.89 ATYPICAL CHEST PAIN: ICD-10-CM

## 2025-01-27 DIAGNOSIS — M25.512 CHRONIC LEFT SHOULDER PAIN: ICD-10-CM

## 2025-01-27 DIAGNOSIS — R09.82 PND (POST-NASAL DRIP): ICD-10-CM

## 2025-01-27 PROCEDURE — 3074F SYST BP LT 130 MM HG: CPT | Mod: GC

## 2025-01-27 PROCEDURE — 99214 OFFICE O/P EST MOD 30 MIN: CPT | Mod: GC

## 2025-01-27 PROCEDURE — 3078F DIAST BP <80 MM HG: CPT | Mod: GC

## 2025-01-27 ASSESSMENT — ENCOUNTER SYMPTOMS
SHORTNESS OF BREATH: 0
SENSORY CHANGE: 0
DEPRESSION: 0
NAUSEA: 0
TREMORS: 0
SPUTUM PRODUCTION: 0
CONSTIPATION: 0
HALLUCINATIONS: 0
BACK PAIN: 0
TINGLING: 0
CLAUDICATION: 0
FEVER: 0
EYES NEGATIVE: 1
ORTHOPNEA: 0
MYALGIAS: 0
NECK PAIN: 0
DIZZINESS: 0
SPEECH CHANGE: 0
NERVOUS/ANXIOUS: 0
HEADACHES: 0
CHILLS: 0
DIARRHEA: 0
PALPITATIONS: 0
COUGH: 0
POLYDIPSIA: 0
WEIGHT LOSS: 0
ABDOMINAL PAIN: 0
HEMOPTYSIS: 0
BRUISES/BLEEDS EASILY: 0
VOMITING: 0

## 2025-01-27 ASSESSMENT — PATIENT HEALTH QUESTIONNAIRE - PHQ9: CLINICAL INTERPRETATION OF PHQ2 SCORE: 0

## 2025-01-27 ASSESSMENT — LIFESTYLE VARIABLES: SUBSTANCE_ABUSE: 0

## 2025-01-27 ASSESSMENT — FIBROSIS 4 INDEX: FIB4 SCORE: 0.95

## 2025-01-27 NOTE — PROGRESS NOTES
Teaching Physician Attestation      Level of Participation    I have personally interviewed and examined the patient.  In addition, I discussed with the resident physician the patient's history, exam, assessment and plan in detail.  Topics listed in my addendum were the focus of the visit.  Healthcare maintenance was not addressed this visit unless listed as a topic in my addendum.  I agree with the plan as written along with the following additions/modifications:    Chest pain, likely related to muscle spasm due to chronic neck/shoulder pain  -non-exertional  -NM stress test nl  -Will book dedicated follow-up visit for full assessment of shoulder/neck pain as is likely triggering chest wall spasm (worsens with arm movement)    Allergic rhinitis  -Allergen reduction strategies reviewed  -Trial Nova pot and Flonase (already was using flonase), netti pot first.  No documentation of glaucoma.  -Follow-up for monitoring    Chronic arm pain  -Patient reports some pain in the trapezius area, however also reports what sounds like radicular symptoms radiating down her arm.  Per notation this has previously been discussed, suspect that arm pain is contributing to muscle spasm which is contributing to chest wall pain.  Allergic rhinitis was the focus of this visit along with NM results f/u.  After discussion with the patient, we will book a dedicated visit within the next week to comprehensively assess neck/shoulder etiologies and create a targeted plan for a physical therapist and potentially home strategies.    Return to clinic within 1 to 2 weeks.  PCR

## 2025-01-27 NOTE — PROGRESS NOTES
Follow Up      Chief Complaint:     Last Seen: 12/3/24  by Dr. Subha Velasquez    History of Present Illness:   Lay Adame is a 54 y.o. postmenopausal female with PMH of obesity, allergies, dyslipidemia, deviated septum (s/p septoplasty), and chronic left shoulder pain who is her for a follow up.     The patient reports ongoing shoulder pain that radiates down the arm, accompanied by numbness and tingling in the hand. The pain is described as a knot in the shoulder area. The patient attributes the numbness to possible sleeping positions but denies improvement with positional changes. Over-the-counter and prescribed medications, such as Flexeril, have been effective in managing symptoms. The patient has not attended physical therapy due to a busy schedule caring for a father with Alzheimer's but is considering it now. She uses her left hand frequently as she is left-handed, which may contribute to the symptoms. Pertinent negatives include no recent trauma or significant changes in physical activity.    The patient also reports a history of post-nasal drip and snoring. She experiences dry mouth upon waking, and there is a concern for potential sleep apnea. A sleep study has been approved, but the patient has not scheduled it yet. Flonase and Claritin have been used to manage symptoms, with the patient learning the correct application technique during the visit. There is a history of nasal surgery (septoplasty) approximately 30 years ago, with subsequent nosebleeds and concerns about nasal alignment, which may be contributing to nasal and throat symptoms. I explained to patient in depth strategies to minimize these allergies.     Review of Systems:  Review of Systems   Constitutional:  Negative for chills, fever, malaise/fatigue and weight loss.   HENT: Negative.     Eyes: Negative.    Respiratory:  Negative for cough, hemoptysis, sputum production and shortness of breath.    Cardiovascular:  Negative for chest  pain, palpitations, orthopnea, claudication and leg swelling.   Gastrointestinal:  Negative for abdominal pain, constipation, diarrhea, nausea and vomiting.   Genitourinary:  Negative for dysuria, frequency and urgency.   Musculoskeletal:  Negative for back pain, joint pain, myalgias and neck pain.   Skin:  Negative for itching and rash.   Neurological:  Negative for dizziness, tingling, tremors, sensory change, speech change and headaches.   Endo/Heme/Allergies:  Negative for environmental allergies and polydipsia. Does not bruise/bleed easily.   Psychiatric/Behavioral:  Negative for depression, hallucinations, substance abuse and suicidal ideas. The patient is not nervous/anxious.    All other systems reviewed and are negative.       Past Medical History:   No past medical history on file.    Patient Active Problem List    Diagnosis Date Noted    Dyslipidemia 12/03/2024    Obesity (BMI 30-39.9) 11/06/2023       Past Surgical History:   No past surgical history on file.     Allergies:  Patient has no known allergies.    Medications:     Current Outpatient Medications:     fluticasone, 1 Spray, Nasal, DAILY    clobetasol, Apply to affected area twice daily for 2 weeks.    estradiol, Insert 1g intravaginally one to three times per week.    loratadine, 10 mg, Oral, DAILY    Ibuprofen, Take 400 mg by mouth.     Social History:   Social History     Tobacco Use    Smoking status: Never    Smokeless tobacco: Never   Vaping Use    Vaping status: Never Used   Substance Use Topics    Alcohol use: Not Currently    Drug use: Never       Family History:   Family History   Problem Relation Age of Onset    Heart Disease Mother 65    Hypertension Mother     Diabetes Mother     Valvular heart disease Father 85       Objective:  Vitals:   There were no vitals taken for this visit. There is no height or weight on file to calculate BMI.    Physical Exam:   Physical Exam  Vitals reviewed.   Constitutional:       Appearance: Normal  appearance.   HENT:      Head: Normocephalic and atraumatic.      Nose: Nose normal.      Mouth/Throat:      Mouth: Mucous membranes are moist.      Pharynx: Oropharynx is clear.   Eyes:      Extraocular Movements: Extraocular movements intact.      Conjunctiva/sclera: Conjunctivae normal.      Pupils: Pupils are equal, round, and reactive to light.   Cardiovascular:      Rate and Rhythm: Normal rate and regular rhythm.      Pulses: Normal pulses.      Heart sounds: Normal heart sounds. No murmur heard.  Pulmonary:      Effort: Pulmonary effort is normal.      Breath sounds: Normal breath sounds. No wheezing, rhonchi or rales.   Abdominal:      General: Abdomen is flat. Bowel sounds are normal. There is no distension.      Palpations: Abdomen is soft. There is no mass.      Tenderness: There is no abdominal tenderness.   Musculoskeletal:         General: Normal range of motion.      Right lower leg: No edema.      Left lower leg: No edema.   Skin:     General: Skin is warm and dry.      Capillary Refill: Capillary refill takes less than 2 seconds.      Findings: No lesion or rash.   Neurological:      General: No focal deficit present.      Mental Status: She is alert and oriented to person, place, and time. Mental status is at baseline.      Cranial Nerves: No cranial nerve deficit.      Motor: No weakness.        Results:  Labs and imaging relevant to this visit were reviewed.     Assessment and Plan:    54 y.o. female with:     1. Atypical chest pain  2. Chronic left shoulder pain  3. Radiculopathy affecting upper extremity  Patient experiencing intermittent left-sided chest pain for almost a year at the prior visit also describes some chronic left shoulder.  Has a significant family history of CAD and was concerned about her heart health.  Given the somewhat atypical presentation can be more common in females, patient got a stress test but this was unremarkable.  Likely muscle pain of some sort will go to PT and  try Flexeril. Shoulder pain seems to be associated with this. Starts in the trapezius muscle but radiates down the whole arm.   -Will follow up shortly to perform a more thorough exam of this shoulder  -Continue Flexeril     4. PND (post-nasal drip)  5. Bleeding from the nose   6. Allergies   Nasal passage quite inflamed at this time on exam. Hx of surgery in her teens that she feels made her sinus issues worse rather than better.  -educated patient on allergy management, allergen covers, HEPA filters, and sinus rinses  -continue loratadine  -continue flonase, educated on the proper usage of this medication   -can consider ENT referral for possible scope evaluation if patient fails medical management given patient's history of possible failed surgery in youth     7. Peripheral Neuropathy   Some tingling sensation noted intermittently in her right sided big toe has a normal diet, no significant alcohol usage, A1c normal. Given it's unilateral less likely for this to be true peripheral neuropathy.  -will discuss more in depth at the next visit    8. Obesity (BMI 30-39.9)  9. Dyslipidemia   ASCVD risk is 1.7%, no statin recommended   -Encouraged lifestyle modifications  -previously referred to nutrition, provided phone number     10. At risk for sleep apnea  -previously referred to sleep medicine, provided phone number    Orders Placed This Encounter    Referral to Physical Therapy     Return in about 1 month (around 2/27/2025).    Bishnu Capellan MD  Internal Medicine PGY-1  [facility]

## 2025-01-27 NOTE — PATIENT INSTRUCTIONS
Pulmonary/sleep [facility]      [address], [address]  [place] [id number] [phone]     Nutrition referral:   [address]  [place] [address]  Phone: [phone]

## 2025-02-03 ENCOUNTER — APPOINTMENT (OUTPATIENT)
Dept: INTERNAL MEDICINE | Facility: OTHER | Age: 55
End: 2025-02-03
Payer: MEDICAID

## 2025-02-24 ENCOUNTER — APPOINTMENT (OUTPATIENT)
Dept: INTERNAL MEDICINE | Facility: OTHER | Age: 55
End: 2025-02-24
Payer: MEDICAID

## 2025-04-02 ENCOUNTER — TELEPHONE (OUTPATIENT)
Dept: INTERNAL MEDICINE | Facility: OTHER | Age: 55
End: 2025-04-02
Payer: MEDICAID

## 2025-04-15 ENCOUNTER — APPOINTMENT (OUTPATIENT)
Dept: INTERNAL MEDICINE | Facility: OTHER | Age: 55
End: 2025-04-15
Payer: MEDICAID

## 2025-05-06 ENCOUNTER — APPOINTMENT (OUTPATIENT)
Dept: INTERNAL MEDICINE | Facility: OTHER | Age: 55
End: 2025-05-06
Payer: MEDICAID

## 2025-05-28 ENCOUNTER — APPOINTMENT (OUTPATIENT)
Dept: INTERNAL MEDICINE | Facility: OTHER | Age: 55
End: 2025-05-28
Payer: MEDICAID

## 2025-06-20 ENCOUNTER — APPOINTMENT (OUTPATIENT)
Dept: INTERNAL MEDICINE | Facility: OTHER | Age: 55
End: 2025-06-20
Payer: MEDICAID

## 2025-06-20 VITALS
TEMPERATURE: 99.1 F | BODY MASS INDEX: 31.92 KG/M2 | WEIGHT: 198.6 LBS | SYSTOLIC BLOOD PRESSURE: 110 MMHG | OXYGEN SATURATION: 96 % | HEIGHT: 66 IN | DIASTOLIC BLOOD PRESSURE: 69 MMHG | HEART RATE: 87 BPM

## 2025-06-20 DIAGNOSIS — B37.2 SKIN YEAST INFECTION: ICD-10-CM

## 2025-06-20 DIAGNOSIS — M54.50 CHRONIC MIDLINE LOW BACK PAIN WITHOUT SCIATICA: ICD-10-CM

## 2025-06-20 DIAGNOSIS — M25.512 CHRONIC LEFT SHOULDER PAIN: ICD-10-CM

## 2025-06-20 DIAGNOSIS — R25.2 MUSCLE CRAMPING: ICD-10-CM

## 2025-06-20 DIAGNOSIS — R20.2 PARESTHESIA OF BILATERAL LEGS: Primary | ICD-10-CM

## 2025-06-20 DIAGNOSIS — G89.29 CHRONIC LEFT SHOULDER PAIN: ICD-10-CM

## 2025-06-20 DIAGNOSIS — G89.29 CHRONIC MIDLINE LOW BACK PAIN WITHOUT SCIATICA: ICD-10-CM

## 2025-06-20 PROCEDURE — 3074F SYST BP LT 130 MM HG: CPT | Performed by: INTERNAL MEDICINE

## 2025-06-20 PROCEDURE — 99214 OFFICE O/P EST MOD 30 MIN: CPT | Performed by: INTERNAL MEDICINE

## 2025-06-20 PROCEDURE — 3078F DIAST BP <80 MM HG: CPT | Performed by: INTERNAL MEDICINE

## 2025-06-20 RX ORDER — NYSTATIN 100000 U/G
1 CREAM TOPICAL 2 TIMES DAILY
Qty: 30 G | Refills: 0 | Status: SHIPPED | OUTPATIENT
Start: 2025-06-20

## 2025-06-20 ASSESSMENT — FIBROSIS 4 INDEX: FIB4 SCORE: 0.95

## 2025-06-20 NOTE — PROGRESS NOTES
"6/20/2025  Chief Complaint   Patient presents with    Follow-Up     Pain in bilateral feet  Shoulder pain still present but better       HISTORY OF PRESENT ILLNESS: Patient is a 54 y.o. female established patient who presents today for follow-up.    Patient report that overall, shoulder and chest pain has improved. However, shoulder/upper back pain does worsen she does strenuous activity around the house. Reports that he muscle relaxant really helped. She denies any further episodes of chest pain, had NM stress test that was normal.    Today, she reports right ankle pain and left foot pain, pain is sharp in nature, feels \"electrical\". Pain started 2 months ago and has progressively worse. She is on her feet all day. Reports lower back pain x 30 years, which hs become worse in the last couple months. However, denies any sciatica/electrical pains down the leg. Has cramping in her legs, mostly in the mornings. Denies any weakness, saddle anesthesia or bladder/bowel incontinence.   No recent injury or falls.   She does take ibuprofen as needed, which does help.    She also c/o bilateral breast rash x 2 months. Rash is red with occasional \"cuts. Worse with warmer weather.  Denies any fevers, chills, nausea, vomiting or purulent drainage.     Past Medical History[1]    Patient Active Problem List    Diagnosis Date Noted    PND (post-nasal drip) 01/27/2025    Dyslipidemia 12/03/2024    Obesity (BMI 30-39.9) 11/06/2023       Allergies:Patient has no known allergies.    Current Medications[2]    Social History[3]    Family History   Problem Relation Age of Onset    Heart Disease Mother 65    Hypertension Mother     Diabetes Mother     Valvular heart disease Father 85         Review of Systems:   Review of Systems   Constitutional:  Negative for chills, fever, malaise/fatigue and weight loss.   HENT:  Negative for sore throat.    Eyes:  Negative for blurred vision and double vision.   Respiratory:  Negative for cough and " "shortness of breath.    Cardiovascular:  Negative for chest pain, palpitations, claudication and leg swelling.   Gastrointestinal:  Negative for abdominal pain, blood in stool, constipation, diarrhea, melena, nausea and vomiting.   Genitourinary:  Negative for dysuria and urgency.   Musculoskeletal:  Positive for back pain. Negative for myalgias.   Skin:  Positive for rash.   Neurological:  Negative for dizziness, weakness and headaches.   Psychiatric/Behavioral:  Negative for depression. The patient is not nervous/anxious.        Exam:  /69 (BP Location: Left arm, Patient Position: Sitting, BP Cuff Size: Adult)   Pulse 87   Temp 37.3 °C (99.1 °F) (Temporal)   Ht 1.676 m (5' 5.98\")   Wt 90.1 kg (198 lb 9.6 oz)   SpO2 96%  Body mass index is 32.07 kg/m².  Physical Exam  Constitutional:       General: She is not in acute distress.     Appearance: She is not toxic-appearing.   HENT:      Head: Normocephalic and atraumatic.      Nose: Nose normal.      Mouth/Throat:      Mouth: Mucous membranes are moist.      Pharynx: Oropharynx is clear.   Eyes:      Extraocular Movements: Extraocular movements intact.   Cardiovascular:      Rate and Rhythm: Normal rate and regular rhythm.   Pulmonary:      Effort: Pulmonary effort is normal.      Breath sounds: Normal breath sounds.   Musculoskeletal:         General: No swelling or deformity.      Cervical back: Neck supple. Tenderness present. No swelling, deformity or bony tenderness.      Thoracic back: Tenderness present. No swelling, deformity or bony tenderness.      Lumbar back: Normal. Normal range of motion. Negative right straight leg raise test and negative left straight leg raise test.      Right lower leg: No swelling, tenderness or bony tenderness. No edema.      Left lower leg: No swelling or tenderness. No edema.      Right ankle: No swelling, deformity or ecchymosis. Normal range of motion.      Left ankle: No swelling, deformity or ecchymosis. Normal " range of motion.      Right foot: No swelling, deformity or tenderness. Normal pulse.      Left foot: No swelling, deformity or tenderness. Normal pulse.   Skin:     General: Skin is warm.      Findings: Rash (well demarcated erythematous rash under bilateral breast, no drainage, no sores) present.   Neurological:      Mental Status: She is alert.           Assessment/Plan:     Paresthesia of bilateral legs  Muscle cramping  Onset 2 months ago, noted in the bilateral feet, has chronic back pain, no sciatica.  Possible plantar fascitis, although location of right foot pain not typical  Also endorse cramping, will check labs a below.  We discuss daily stretches of lower extremities and foot.  - HEMOGLOBIN A1C; Future  - TSH WITH REFLEX TO FT4; Future  - VITAMIN B12; Future  - VITAMIN D,25 HYDROXY (DEFICIENCY); Future  - FOLATE; Future  - Comp Metabolic Panel; Future  - MAGNESIUM; Future  - CBC WITHOUT DIFFERENTIAL; Future  - IRON/TOTAL IRON BIND; Future    Chronic left shoulder pain  Improving, shoulder Xray normal, suspect MSK with tight trapezius  Recommend daily exercises/stretches  Tylenol 1g TID or Ibuprofen 600mg q6h  - Referral to Physical Therapy    Chronic midline low back pain without sciatica  Chronic x 30 years, progressively worsening in the last 2 months.  Tylenol 1g TID or Ibuprofen 600mg q6h  - DX-LUMBAR SPINE-2 OR 3 VIEWS; Future  - Referral to Physical Therapy    Skin yeast infection  Bilateral breast, keep area dry and clean  Nystatin powder BID, recommend moisture wicking clothing or sheets for skin folds  - nystatin (MYCOSTATIN) 860970 UNIT/GM Cream topical cream; Apply 1 g topically 2 times a day.  Dispense: 30 g; Refill: 0      All imaging results and lab results and consult notes are reviewed at this visit.  Followup: Return in about 2 months (around 8/20/2025).             [1] No past medical history on file.  [2]   Current Outpatient Medications   Medication Sig Dispense Refill    nystatin  (MYCOSTATIN) 074947 UNIT/GM Cream topical cream Apply 1 g topically 2 times a day. 30 g 0    fluticasone (FLONASE) 50 MCG/ACT nasal spray Administer 1 Spray into affected nostril(S) every day. 16 g 1    clobetasol (TEMOVATE) 0.05 % Ointment Apply to affected area twice daily for 2 weeks. 60 g 1    estradiol (ESTRACE) 0.1 MG/GM vaginal cream Insert 1g intravaginally one to three times per week. 42.5 g 2    loratadine (CLARITIN) 10 MG Tab Take 1 Tablet by mouth every day. 30 Tablet 2    Ibuprofen 200 MG Cap Take 400 mg by mouth.       No current facility-administered medications for this visit.   [3]   Social History  Tobacco Use    Smoking status: Never    Smokeless tobacco: Never   Vaping Use    Vaping status: Never Used   Substance Use Topics    Alcohol use: Not Currently    Drug use: Never

## 2025-06-24 ENCOUNTER — APPOINTMENT (OUTPATIENT)
Dept: RADIOLOGY | Facility: IMAGING CENTER | Age: 55
End: 2025-06-24
Attending: INTERNAL MEDICINE
Payer: MEDICAID

## 2025-06-24 ENCOUNTER — NON-PROVIDER VISIT (OUTPATIENT)
Dept: URGENT CARE | Facility: PHYSICIAN GROUP | Age: 55
End: 2025-06-24
Payer: MEDICAID

## 2025-06-24 ENCOUNTER — HOSPITAL ENCOUNTER (OUTPATIENT)
Dept: LAB | Facility: MEDICAL CENTER | Age: 55
End: 2025-06-24
Attending: INTERNAL MEDICINE
Payer: MEDICAID

## 2025-06-24 DIAGNOSIS — M54.50 CHRONIC MIDLINE LOW BACK PAIN WITHOUT SCIATICA: ICD-10-CM

## 2025-06-24 DIAGNOSIS — R20.2 PARESTHESIA OF BILATERAL LEGS: ICD-10-CM

## 2025-06-24 DIAGNOSIS — G89.29 CHRONIC MIDLINE LOW BACK PAIN WITHOUT SCIATICA: ICD-10-CM

## 2025-06-24 DIAGNOSIS — R25.2 MUSCLE CRAMPING: ICD-10-CM

## 2025-06-24 LAB
25(OH)D3 SERPL-MCNC: 34 NG/ML (ref 30–100)
ALBUMIN SERPL BCP-MCNC: 4.5 G/DL (ref 3.2–4.9)
ALBUMIN/GLOB SERPL: 1.8 G/DL
ALP SERPL-CCNC: 52 U/L (ref 30–99)
ALT SERPL-CCNC: 25 U/L (ref 2–50)
ANION GAP SERPL CALC-SCNC: 11 MMOL/L (ref 7–16)
AST SERPL-CCNC: 22 U/L (ref 12–45)
BILIRUB SERPL-MCNC: 0.5 MG/DL (ref 0.1–1.5)
BUN SERPL-MCNC: 15 MG/DL (ref 8–22)
CALCIUM ALBUM COR SERPL-MCNC: 9.2 MG/DL (ref 8.5–10.5)
CALCIUM SERPL-MCNC: 9.6 MG/DL (ref 8.5–10.5)
CHLORIDE SERPL-SCNC: 103 MMOL/L (ref 96–112)
CO2 SERPL-SCNC: 26 MMOL/L (ref 20–33)
CREAT SERPL-MCNC: 0.93 MG/DL (ref 0.5–1.4)
ERYTHROCYTE [DISTWIDTH] IN BLOOD BY AUTOMATED COUNT: 41.1 FL (ref 35.9–50)
EST. AVERAGE GLUCOSE BLD GHB EST-MCNC: 111 MG/DL
FOLATE SERPL-MCNC: 29.6 NG/ML
GFR SERPLBLD CREATININE-BSD FMLA CKD-EPI: 74 ML/MIN/1.73 M 2
GLOBULIN SER CALC-MCNC: 2.5 G/DL (ref 1.9–3.5)
GLUCOSE SERPL-MCNC: 96 MG/DL (ref 65–99)
HBA1C MFR BLD: 5.5 % (ref 4–5.6)
HCT VFR BLD AUTO: 44.7 % (ref 37–47)
HGB BLD-MCNC: 14.9 G/DL (ref 12–16)
IRON SATN MFR SERPL: 31 % (ref 15–55)
IRON SERPL-MCNC: 103 UG/DL (ref 40–170)
MAGNESIUM SERPL-MCNC: 2.1 MG/DL (ref 1.5–2.5)
MCH RBC QN AUTO: 29.9 PG (ref 27–33)
MCHC RBC AUTO-ENTMCNC: 33.3 G/DL (ref 32.2–35.5)
MCV RBC AUTO: 89.6 FL (ref 81.4–97.8)
PLATELET # BLD AUTO: 192 K/UL (ref 164–446)
PMV BLD AUTO: 10.6 FL (ref 9–12.9)
POTASSIUM SERPL-SCNC: 4.3 MMOL/L (ref 3.6–5.5)
PROT SERPL-MCNC: 7 G/DL (ref 6–8.2)
RBC # BLD AUTO: 4.99 M/UL (ref 4.2–5.4)
SODIUM SERPL-SCNC: 140 MMOL/L (ref 135–145)
TIBC SERPL-MCNC: 328 UG/DL (ref 250–450)
TSH SERPL DL<=0.005 MIU/L-ACNC: 2.19 UIU/ML (ref 0.38–5.33)
UIBC SERPL-MCNC: 225 UG/DL (ref 110–370)
VIT B12 SERPL-MCNC: 655 PG/ML (ref 211–911)
WBC # BLD AUTO: 5.2 K/UL (ref 4.8–10.8)

## 2025-06-24 PROCEDURE — 83036 HEMOGLOBIN GLYCOSYLATED A1C: CPT

## 2025-06-24 PROCEDURE — 85027 COMPLETE CBC AUTOMATED: CPT

## 2025-06-24 PROCEDURE — 83735 ASSAY OF MAGNESIUM: CPT

## 2025-06-24 PROCEDURE — 72100 X-RAY EXAM L-S SPINE 2/3 VWS: CPT | Mod: TC,FY | Performed by: INTERNAL MEDICINE

## 2025-06-24 PROCEDURE — 83540 ASSAY OF IRON: CPT

## 2025-06-24 PROCEDURE — 82607 VITAMIN B-12: CPT

## 2025-06-24 PROCEDURE — 84443 ASSAY THYROID STIM HORMONE: CPT

## 2025-06-24 PROCEDURE — 82306 VITAMIN D 25 HYDROXY: CPT

## 2025-06-24 PROCEDURE — 36415 COLL VENOUS BLD VENIPUNCTURE: CPT

## 2025-06-24 PROCEDURE — 82746 ASSAY OF FOLIC ACID SERUM: CPT

## 2025-06-24 PROCEDURE — 80053 COMPREHEN METABOLIC PANEL: CPT

## 2025-06-24 PROCEDURE — 83550 IRON BINDING TEST: CPT

## 2025-06-24 ASSESSMENT — ENCOUNTER SYMPTOMS
MYALGIAS: 0
DIARRHEA: 0
VOMITING: 0
NAUSEA: 0
CHILLS: 0
WEAKNESS: 0
NERVOUS/ANXIOUS: 0
BACK PAIN: 1
WEIGHT LOSS: 0
CLAUDICATION: 0
SHORTNESS OF BREATH: 0
PALPITATIONS: 0
BLURRED VISION: 0
DIZZINESS: 0
DOUBLE VISION: 0
BLOOD IN STOOL: 0
CONSTIPATION: 0
FEVER: 0
HEADACHES: 0
DEPRESSION: 0
SORE THROAT: 0
COUGH: 0
ABDOMINAL PAIN: 0

## 2025-06-25 ENCOUNTER — RESULTS FOLLOW-UP (OUTPATIENT)
Dept: INTERNAL MEDICINE | Facility: OTHER | Age: 55
End: 2025-06-25

## 2025-06-26 NOTE — Clinical Note
REFERRAL APPROVAL NOTICE         Sent on June 26, 2025                   Lay Adame  [address] [place] [address] [place] [address]                   Dear Ms. Adame,    After a careful review of the medical information and benefit coverage, [facility] has processed your referral. See below for additional details.    If applicable, you must be actively enrolled with your insurance for coverage of the authorized service. If you have any questions regarding your coverage, please contact your insurance directly.    REFERRAL INFORMATION   Referral #:  [id number]  Referred-To Provider    Referred-By Provider:  Physical Therapy    Subha Velasquez D.O.   [facility]      [address] [facility] [place] [address]  [phone] [address]  [place] [address]  [phone]    Referral Start Date:  06/20/2025  Referral End Date:   06/20/2026             SCHEDULING  If you do not already have an appointment, please call [phone] to make an appointment.     MORE INFORMATION  If you do not already have a [employer] account, sign up at: [email or web address]  You can access your medical information, make appointments, see lab results, billing information, and more.  If you have questions regarding this referral, please contact  the [facility] Referrals department at:             [phone]. Monday - Friday 8:00AM - 5:00PM.     Sincerely,    [facility]

## 2025-08-26 ENCOUNTER — OFFICE VISIT (OUTPATIENT)
Dept: INTERNAL MEDICINE | Facility: OTHER | Age: 55
End: 2025-08-26
Payer: MEDICAID

## 2025-08-26 VITALS
TEMPERATURE: 97.9 F | HEART RATE: 73 BPM | WEIGHT: 203 LBS | SYSTOLIC BLOOD PRESSURE: 100 MMHG | BODY MASS INDEX: 32.78 KG/M2 | OXYGEN SATURATION: 97 % | DIASTOLIC BLOOD PRESSURE: 62 MMHG

## 2025-08-26 DIAGNOSIS — H53.8 BLURRY VISION, BILATERAL: ICD-10-CM

## 2025-08-26 DIAGNOSIS — M54.50 CHRONIC MIDLINE LOW BACK PAIN WITHOUT SCIATICA: ICD-10-CM

## 2025-08-26 DIAGNOSIS — E66.9 OBESITY (BMI 30-39.9): ICD-10-CM

## 2025-08-26 DIAGNOSIS — Z12.31 ENCOUNTER FOR SCREENING MAMMOGRAM FOR BREAST CANCER: ICD-10-CM

## 2025-08-26 DIAGNOSIS — B37.2 SKIN YEAST INFECTION: ICD-10-CM

## 2025-08-26 DIAGNOSIS — E78.5 DYSLIPIDEMIA: Primary | ICD-10-CM

## 2025-08-26 DIAGNOSIS — G89.29 CHRONIC MIDLINE LOW BACK PAIN WITHOUT SCIATICA: ICD-10-CM

## 2025-08-26 DIAGNOSIS — M79.672 FOOT PAIN, LEFT: ICD-10-CM

## 2025-08-26 DIAGNOSIS — M72.2 PLANTAR FASCIITIS, BILATERAL: ICD-10-CM

## 2025-08-26 DIAGNOSIS — G56.03 CARPAL TUNNEL SYNDROME, BILATERAL: ICD-10-CM

## 2025-08-26 PROCEDURE — 3074F SYST BP LT 130 MM HG: CPT | Performed by: INTERNAL MEDICINE

## 2025-08-26 PROCEDURE — 3078F DIAST BP <80 MM HG: CPT | Performed by: INTERNAL MEDICINE

## 2025-08-26 PROCEDURE — 99214 OFFICE O/P EST MOD 30 MIN: CPT | Performed by: INTERNAL MEDICINE

## 2025-08-26 RX ORDER — NYSTATIN 100000 [USP'U]/G
1 POWDER TOPICAL 3 TIMES DAILY
Qty: 60 G | Refills: 1 | Status: SHIPPED | OUTPATIENT
Start: 2025-08-26

## 2025-08-26 ASSESSMENT — FIBROSIS 4 INDEX: FIB4 SCORE: 1.19

## 2025-08-27 ASSESSMENT — ENCOUNTER SYMPTOMS
DOUBLE VISION: 0
BACK PAIN: 1
ORTHOPNEA: 0
FEVER: 0
DIARRHEA: 0
NAUSEA: 0
WEAKNESS: 0
SEIZURES: 0
TINGLING: 1
DIZZINESS: 0
NERVOUS/ANXIOUS: 0
VOMITING: 0
PHOTOPHOBIA: 0
HEADACHES: 0
EYE PAIN: 0
CONSTIPATION: 0
EYE DISCHARGE: 0
DEPRESSION: 0
BLURRED VISION: 1
WEIGHT LOSS: 0
EYE REDNESS: 0
SORE THROAT: 0
BLOOD IN STOOL: 0
INSOMNIA: 0
CHILLS: 0
COUGH: 0
ABDOMINAL PAIN: 0
SHORTNESS OF BREATH: 0
PALPITATIONS: 0
MYALGIAS: 1

## 2025-08-29 ENCOUNTER — NON-PROVIDER VISIT (OUTPATIENT)
Dept: URGENT CARE | Facility: PHYSICIAN GROUP | Age: 55
End: 2025-08-29
Payer: MEDICAID

## 2025-08-29 ENCOUNTER — APPOINTMENT (OUTPATIENT)
Dept: RADIOLOGY | Facility: IMAGING CENTER | Age: 55
End: 2025-08-29
Attending: INTERNAL MEDICINE
Payer: MEDICAID

## 2025-08-29 DIAGNOSIS — M79.672 FOOT PAIN, LEFT: ICD-10-CM
